# Patient Record
Sex: MALE | Race: OTHER | NOT HISPANIC OR LATINO
[De-identification: names, ages, dates, MRNs, and addresses within clinical notes are randomized per-mention and may not be internally consistent; named-entity substitution may affect disease eponyms.]

---

## 2017-03-13 ENCOUNTER — APPOINTMENT (OUTPATIENT)
Dept: OTOLARYNGOLOGY | Facility: CLINIC | Age: 37
End: 2017-03-13

## 2017-03-13 VITALS
DIASTOLIC BLOOD PRESSURE: 79 MMHG | TEMPERATURE: 97.9 F | SYSTOLIC BLOOD PRESSURE: 145 MMHG | HEART RATE: 52 BPM | OXYGEN SATURATION: 94 %

## 2017-03-13 DIAGNOSIS — H61.23 IMPACTED CERUMEN, BILATERAL: ICD-10-CM

## 2017-03-13 DIAGNOSIS — J06.9 ACUTE UPPER RESPIRATORY INFECTION, UNSPECIFIED: ICD-10-CM

## 2017-03-13 RX ORDER — AZITHROMYCIN 250 MG/1
250 TABLET, FILM COATED ORAL
Qty: 6 | Refills: 0 | Status: ACTIVE | OUTPATIENT
Start: 2017-03-13

## 2017-03-15 RX ORDER — AZITHROMYCIN 250 MG/1
250 TABLET, FILM COATED ORAL
Qty: 6 | Refills: 0 | Status: ACTIVE | COMMUNITY
Start: 2017-03-15 | End: 1900-01-01

## 2017-08-28 ENCOUNTER — APPOINTMENT (OUTPATIENT)
Dept: OTOLARYNGOLOGY | Facility: CLINIC | Age: 37
End: 2017-08-28
Payer: COMMERCIAL

## 2017-08-28 VITALS — HEART RATE: 46 BPM | OXYGEN SATURATION: 98 % | SYSTOLIC BLOOD PRESSURE: 132 MMHG | DIASTOLIC BLOOD PRESSURE: 80 MMHG

## 2017-08-28 PROCEDURE — 31231 NASAL ENDOSCOPY DX: CPT

## 2017-08-28 PROCEDURE — 99214 OFFICE O/P EST MOD 30 MIN: CPT | Mod: 25

## 2017-08-28 RX ORDER — AMOXICILLIN AND CLAVULANATE POTASSIUM 875; 125 MG/1; MG/1
875-125 TABLET, COATED ORAL
Qty: 20 | Refills: 0 | Status: ACTIVE | COMMUNITY
Start: 2017-08-28 | End: 1900-01-01

## 2017-08-28 RX ORDER — AMOXICILLIN 500 MG/1
500 CAPSULE ORAL
Qty: 4 | Refills: 0 | Status: ACTIVE | COMMUNITY
Start: 2017-06-22

## 2017-08-28 RX ORDER — CYCLOBENZAPRINE HYDROCHLORIDE 5 MG/1
5 TABLET, FILM COATED ORAL
Qty: 30 | Refills: 0 | Status: ACTIVE | COMMUNITY
Start: 2017-05-04

## 2017-09-11 ENCOUNTER — APPOINTMENT (OUTPATIENT)
Dept: OTOLARYNGOLOGY | Facility: CLINIC | Age: 37
End: 2017-09-11
Payer: COMMERCIAL

## 2017-09-11 VITALS — HEART RATE: 52 BPM | OXYGEN SATURATION: 97 % | SYSTOLIC BLOOD PRESSURE: 126 MMHG | DIASTOLIC BLOOD PRESSURE: 83 MMHG

## 2017-09-11 PROCEDURE — 99214 OFFICE O/P EST MOD 30 MIN: CPT

## 2017-09-11 RX ORDER — SULFAMETHOXAZOLE AND TRIMETHOPRIM 800; 160 MG/1; MG/1
800-160 TABLET ORAL TWICE DAILY
Qty: 20 | Refills: 0 | Status: ACTIVE | COMMUNITY
Start: 2017-09-11 | End: 1900-01-01

## 2017-09-27 ENCOUNTER — APPOINTMENT (OUTPATIENT)
Dept: OTOLARYNGOLOGY | Facility: CLINIC | Age: 37
End: 2017-09-27
Payer: COMMERCIAL

## 2017-09-27 VITALS — HEART RATE: 53 BPM | SYSTOLIC BLOOD PRESSURE: 127 MMHG | OXYGEN SATURATION: 96 % | DIASTOLIC BLOOD PRESSURE: 74 MMHG

## 2017-09-27 DIAGNOSIS — H92.02 OTALGIA, LEFT EAR: ICD-10-CM

## 2017-09-27 DIAGNOSIS — J32.2 CHRONIC ETHMOIDAL SINUSITIS: ICD-10-CM

## 2017-09-27 PROCEDURE — 99214 OFFICE O/P EST MOD 30 MIN: CPT

## 2018-04-09 ENCOUNTER — APPOINTMENT (OUTPATIENT)
Dept: OTOLARYNGOLOGY | Facility: CLINIC | Age: 38
End: 2018-04-09

## 2018-05-22 ENCOUNTER — RX RENEWAL (OUTPATIENT)
Age: 38
End: 2018-05-22

## 2018-05-22 RX ORDER — FLUTICASONE PROPIONATE 50 UG/1
50 SPRAY, METERED NASAL DAILY
Qty: 16 | Refills: 2 | Status: ACTIVE | COMMUNITY
Start: 2017-08-28 | End: 1900-01-01

## 2018-06-01 ENCOUNTER — APPOINTMENT (OUTPATIENT)
Dept: OTOLARYNGOLOGY | Facility: CLINIC | Age: 38
End: 2018-06-01
Payer: COMMERCIAL

## 2018-06-01 VITALS
DIASTOLIC BLOOD PRESSURE: 79 MMHG | TEMPERATURE: 98.3 F | HEART RATE: 64 BPM | SYSTOLIC BLOOD PRESSURE: 129 MMHG | OXYGEN SATURATION: 95 %

## 2018-06-01 DIAGNOSIS — H61.23 IMPACTED CERUMEN, BILATERAL: ICD-10-CM

## 2018-06-01 PROCEDURE — 99213 OFFICE O/P EST LOW 20 MIN: CPT

## 2018-06-01 RX ORDER — ERYTHROMYCIN 5 MG/G
5 OINTMENT OPHTHALMIC
Qty: 4 | Refills: 0 | Status: ACTIVE | COMMUNITY
Start: 2018-04-19

## 2018-06-01 RX ORDER — CETIRIZINE HYDROCHLORIDE 10 MG/1
10 TABLET, FILM COATED ORAL DAILY
Qty: 90 | Refills: 1 | Status: ACTIVE | COMMUNITY
Start: 2018-06-01 | End: 1900-01-01

## 2019-12-14 ENCOUNTER — INPATIENT (INPATIENT)
Facility: HOSPITAL | Age: 39
LOS: 2 days | Discharge: HOME | DRG: 340 | End: 2019-12-17
Attending: SURGERY | Admitting: SURGERY
Payer: COMMERCIAL

## 2019-12-14 ENCOUNTER — RESULT REVIEW (OUTPATIENT)
Age: 39
End: 2019-12-14

## 2019-12-14 ENCOUNTER — TRANSCRIPTION ENCOUNTER (OUTPATIENT)
Age: 39
End: 2019-12-14

## 2019-12-14 VITALS
HEART RATE: 65 BPM | RESPIRATION RATE: 18 BRPM | WEIGHT: 162.92 LBS | TEMPERATURE: 98 F | OXYGEN SATURATION: 98 % | HEIGHT: 69 IN | SYSTOLIC BLOOD PRESSURE: 122 MMHG | DIASTOLIC BLOOD PRESSURE: 67 MMHG

## 2019-12-14 LAB
ALBUMIN SERPL ELPH-MCNC: 4.4 G/DL — SIGNIFICANT CHANGE UP (ref 3.3–5)
ALP SERPL-CCNC: 90 U/L — SIGNIFICANT CHANGE UP (ref 40–120)
ALT FLD-CCNC: 23 U/L — SIGNIFICANT CHANGE UP (ref 10–45)
ANION GAP SERPL CALC-SCNC: 13 MMOL/L — SIGNIFICANT CHANGE UP (ref 5–17)
APPEARANCE UR: CLEAR — SIGNIFICANT CHANGE UP
AST SERPL-CCNC: 19 U/L — SIGNIFICANT CHANGE UP (ref 10–40)
BASOPHILS # BLD AUTO: 0.04 K/UL — SIGNIFICANT CHANGE UP (ref 0–0.2)
BASOPHILS NFR BLD AUTO: 0.4 % — SIGNIFICANT CHANGE UP (ref 0–2)
BILIRUB SERPL-MCNC: 0.9 MG/DL — SIGNIFICANT CHANGE UP (ref 0.2–1.2)
BILIRUB UR-MCNC: NEGATIVE — SIGNIFICANT CHANGE UP
BUN SERPL-MCNC: 15 MG/DL — SIGNIFICANT CHANGE UP (ref 7–23)
CALCIUM SERPL-MCNC: 9 MG/DL — SIGNIFICANT CHANGE UP (ref 8.4–10.5)
CHLORIDE SERPL-SCNC: 98 MMOL/L — SIGNIFICANT CHANGE UP (ref 96–108)
CO2 SERPL-SCNC: 25 MMOL/L — SIGNIFICANT CHANGE UP (ref 22–31)
COLOR SPEC: YELLOW — SIGNIFICANT CHANGE UP
CREAT SERPL-MCNC: 0.98 MG/DL — SIGNIFICANT CHANGE UP (ref 0.5–1.3)
DIFF PNL FLD: NEGATIVE — SIGNIFICANT CHANGE UP
EOSINOPHIL # BLD AUTO: 0 K/UL — SIGNIFICANT CHANGE UP (ref 0–0.5)
EOSINOPHIL NFR BLD AUTO: 0 % — SIGNIFICANT CHANGE UP (ref 0–6)
GLUCOSE SERPL-MCNC: 180 MG/DL — HIGH (ref 70–99)
GLUCOSE UR QL: NEGATIVE — SIGNIFICANT CHANGE UP
HCT VFR BLD CALC: 44.6 % — SIGNIFICANT CHANGE UP (ref 39–50)
HGB BLD-MCNC: 14.4 G/DL — SIGNIFICANT CHANGE UP (ref 13–17)
IMM GRANULOCYTES NFR BLD AUTO: 0.4 % — SIGNIFICANT CHANGE UP (ref 0–1.5)
KETONES UR-MCNC: NEGATIVE — SIGNIFICANT CHANGE UP
LACTATE SERPL-SCNC: 1.4 MMOL/L — SIGNIFICANT CHANGE UP (ref 0.5–2)
LEUKOCYTE ESTERASE UR-ACNC: NEGATIVE — SIGNIFICANT CHANGE UP
LIDOCAIN IGE QN: 12 U/L — SIGNIFICANT CHANGE UP (ref 7–60)
LYMPHOCYTES # BLD AUTO: 0.5 K/UL — LOW (ref 1–3.3)
LYMPHOCYTES # BLD AUTO: 4.4 % — LOW (ref 13–44)
MCHC RBC-ENTMCNC: 29.1 PG — SIGNIFICANT CHANGE UP (ref 27–34)
MCHC RBC-ENTMCNC: 32.3 GM/DL — SIGNIFICANT CHANGE UP (ref 32–36)
MCV RBC AUTO: 90.1 FL — SIGNIFICANT CHANGE UP (ref 80–100)
MONOCYTES # BLD AUTO: 0.66 K/UL — SIGNIFICANT CHANGE UP (ref 0–0.9)
MONOCYTES NFR BLD AUTO: 5.9 % — SIGNIFICANT CHANGE UP (ref 2–14)
NEUTROPHILS # BLD AUTO: 10.03 K/UL — HIGH (ref 1.8–7.4)
NEUTROPHILS NFR BLD AUTO: 88.9 % — HIGH (ref 43–77)
NITRITE UR-MCNC: NEGATIVE — SIGNIFICANT CHANGE UP
NRBC # BLD: 0 /100 WBCS — SIGNIFICANT CHANGE UP (ref 0–0)
PH UR: 7 — SIGNIFICANT CHANGE UP (ref 5–8)
PLATELET # BLD AUTO: 197 K/UL — SIGNIFICANT CHANGE UP (ref 150–400)
POTASSIUM SERPL-MCNC: 3.9 MMOL/L — SIGNIFICANT CHANGE UP (ref 3.5–5.3)
POTASSIUM SERPL-SCNC: 3.9 MMOL/L — SIGNIFICANT CHANGE UP (ref 3.5–5.3)
PROT SERPL-MCNC: 7 G/DL — SIGNIFICANT CHANGE UP (ref 6–8.3)
PROT UR-MCNC: NEGATIVE MG/DL — SIGNIFICANT CHANGE UP
RBC # BLD: 4.95 M/UL — SIGNIFICANT CHANGE UP (ref 4.2–5.8)
RBC # FLD: 12.3 % — SIGNIFICANT CHANGE UP (ref 10.3–14.5)
SODIUM SERPL-SCNC: 136 MMOL/L — SIGNIFICANT CHANGE UP (ref 135–145)
SP GR SPEC: <=1.005 — SIGNIFICANT CHANGE UP (ref 1–1.03)
UROBILINOGEN FLD QL: 0.2 E.U./DL — SIGNIFICANT CHANGE UP
WBC # BLD: 11.27 K/UL — HIGH (ref 3.8–10.5)
WBC # FLD AUTO: 11.27 K/UL — HIGH (ref 3.8–10.5)

## 2019-12-14 PROCEDURE — 93010 ELECTROCARDIOGRAM REPORT: CPT | Mod: NC

## 2019-12-14 PROCEDURE — 74177 CT ABD & PELVIS W/CONTRAST: CPT | Mod: 26

## 2019-12-14 PROCEDURE — 88304 TISSUE EXAM BY PATHOLOGIST: CPT | Mod: 26

## 2019-12-14 PROCEDURE — 99285 EMERGENCY DEPT VISIT HI MDM: CPT

## 2019-12-14 RX ORDER — SODIUM CHLORIDE 9 MG/ML
1000 INJECTION INTRAMUSCULAR; INTRAVENOUS; SUBCUTANEOUS
Refills: 0 | Status: DISCONTINUED | OUTPATIENT
Start: 2019-12-14 | End: 2019-12-15

## 2019-12-14 RX ORDER — CEFTRIAXONE 500 MG/1
1000 INJECTION, POWDER, FOR SOLUTION INTRAMUSCULAR; INTRAVENOUS ONCE
Refills: 0 | Status: COMPLETED | OUTPATIENT
Start: 2019-12-14 | End: 2019-12-14

## 2019-12-14 RX ORDER — METRONIDAZOLE 500 MG
500 TABLET ORAL EVERY 8 HOURS
Refills: 0 | Status: DISCONTINUED | OUTPATIENT
Start: 2019-12-14 | End: 2019-12-17

## 2019-12-14 RX ORDER — KETOROLAC TROMETHAMINE 30 MG/ML
15 SYRINGE (ML) INJECTION EVERY 6 HOURS
Refills: 0 | Status: COMPLETED | OUTPATIENT
Start: 2019-12-15 | End: 2019-12-18

## 2019-12-14 RX ORDER — SODIUM CHLORIDE 9 MG/ML
1000 INJECTION INTRAMUSCULAR; INTRAVENOUS; SUBCUTANEOUS ONCE
Refills: 0 | Status: COMPLETED | OUTPATIENT
Start: 2019-12-14 | End: 2019-12-14

## 2019-12-14 RX ORDER — HYDROMORPHONE HYDROCHLORIDE 2 MG/ML
0.5 INJECTION INTRAMUSCULAR; INTRAVENOUS; SUBCUTANEOUS EVERY 6 HOURS
Refills: 0 | Status: DISCONTINUED | OUTPATIENT
Start: 2019-12-14 | End: 2019-12-14

## 2019-12-14 RX ORDER — METRONIDAZOLE 500 MG
500 TABLET ORAL ONCE
Refills: 0 | Status: COMPLETED | OUTPATIENT
Start: 2019-12-14 | End: 2019-12-14

## 2019-12-14 RX ORDER — MORPHINE SULFATE 50 MG/1
4 CAPSULE, EXTENDED RELEASE ORAL ONCE
Refills: 0 | Status: DISCONTINUED | OUTPATIENT
Start: 2019-12-14 | End: 2019-12-14

## 2019-12-14 RX ORDER — HYDROMORPHONE HYDROCHLORIDE 2 MG/ML
0.5 INJECTION INTRAMUSCULAR; INTRAVENOUS; SUBCUTANEOUS
Refills: 0 | Status: DISCONTINUED | OUTPATIENT
Start: 2019-12-14 | End: 2019-12-15

## 2019-12-14 RX ORDER — ENOXAPARIN SODIUM 100 MG/ML
40 INJECTION SUBCUTANEOUS EVERY 24 HOURS
Refills: 0 | Status: DISCONTINUED | OUTPATIENT
Start: 2019-12-14 | End: 2019-12-17

## 2019-12-14 RX ORDER — OXYCODONE HYDROCHLORIDE 5 MG/1
5 TABLET ORAL EVERY 4 HOURS
Refills: 0 | Status: DISCONTINUED | OUTPATIENT
Start: 2019-12-14 | End: 2019-12-17

## 2019-12-14 RX ORDER — ONDANSETRON 8 MG/1
4 TABLET, FILM COATED ORAL EVERY 6 HOURS
Refills: 0 | Status: DISCONTINUED | OUTPATIENT
Start: 2019-12-14 | End: 2019-12-17

## 2019-12-14 RX ORDER — IOHEXOL 300 MG/ML
30 INJECTION, SOLUTION INTRAVENOUS ONCE
Refills: 0 | Status: COMPLETED | OUTPATIENT
Start: 2019-12-14 | End: 2019-12-14

## 2019-12-14 RX ORDER — OXYCODONE HYDROCHLORIDE 5 MG/1
10 TABLET ORAL EVERY 4 HOURS
Refills: 0 | Status: DISCONTINUED | OUTPATIENT
Start: 2019-12-14 | End: 2019-12-17

## 2019-12-14 RX ORDER — SODIUM CHLORIDE 9 MG/ML
1000 INJECTION INTRAMUSCULAR; INTRAVENOUS; SUBCUTANEOUS
Refills: 0 | Status: DISCONTINUED | OUTPATIENT
Start: 2019-12-14 | End: 2019-12-14

## 2019-12-14 RX ORDER — HYDROMORPHONE HYDROCHLORIDE 2 MG/ML
0.5 INJECTION INTRAMUSCULAR; INTRAVENOUS; SUBCUTANEOUS EVERY 4 HOURS
Refills: 0 | Status: DISCONTINUED | OUTPATIENT
Start: 2019-12-14 | End: 2019-12-17

## 2019-12-14 RX ORDER — ACETAMINOPHEN 500 MG
975 TABLET ORAL EVERY 6 HOURS
Refills: 0 | Status: DISCONTINUED | OUTPATIENT
Start: 2019-12-15 | End: 2019-12-17

## 2019-12-14 RX ORDER — HYDROMORPHONE HYDROCHLORIDE 2 MG/ML
1 INJECTION INTRAMUSCULAR; INTRAVENOUS; SUBCUTANEOUS EVERY 6 HOURS
Refills: 0 | Status: DISCONTINUED | OUTPATIENT
Start: 2019-12-14 | End: 2019-12-14

## 2019-12-14 RX ORDER — CEFTRIAXONE 500 MG/1
1000 INJECTION, POWDER, FOR SOLUTION INTRAMUSCULAR; INTRAVENOUS EVERY 24 HOURS
Refills: 0 | Status: DISCONTINUED | OUTPATIENT
Start: 2019-12-15 | End: 2019-12-17

## 2019-12-14 RX ADMIN — IOHEXOL 30 MILLILITER(S): 300 INJECTION, SOLUTION INTRAVENOUS at 11:30

## 2019-12-14 RX ADMIN — SODIUM CHLORIDE 115 MILLILITER(S): 9 INJECTION INTRAMUSCULAR; INTRAVENOUS; SUBCUTANEOUS at 16:00

## 2019-12-14 RX ADMIN — OXYCODONE HYDROCHLORIDE 5 MILLIGRAM(S): 5 TABLET ORAL at 21:49

## 2019-12-14 RX ADMIN — Medication 100 MILLIGRAM(S): at 23:52

## 2019-12-14 RX ADMIN — CEFTRIAXONE 1000 MILLIGRAM(S): 500 INJECTION, POWDER, FOR SOLUTION INTRAMUSCULAR; INTRAVENOUS at 15:10

## 2019-12-14 RX ADMIN — ENOXAPARIN SODIUM 40 MILLIGRAM(S): 100 INJECTION SUBCUTANEOUS at 21:29

## 2019-12-14 RX ADMIN — SODIUM CHLORIDE 1000 MILLILITER(S): 9 INJECTION INTRAMUSCULAR; INTRAVENOUS; SUBCUTANEOUS at 11:30

## 2019-12-14 RX ADMIN — MORPHINE SULFATE 4 MILLIGRAM(S): 50 CAPSULE, EXTENDED RELEASE ORAL at 12:13

## 2019-12-14 RX ADMIN — Medication 100 MILLIGRAM(S): at 15:13

## 2019-12-14 RX ADMIN — CEFTRIAXONE 100 MILLIGRAM(S): 500 INJECTION, POWDER, FOR SOLUTION INTRAMUSCULAR; INTRAVENOUS at 14:52

## 2019-12-14 RX ADMIN — OXYCODONE HYDROCHLORIDE 5 MILLIGRAM(S): 5 TABLET ORAL at 22:49

## 2019-12-14 RX ADMIN — MORPHINE SULFATE 4 MILLIGRAM(S): 50 CAPSULE, EXTENDED RELEASE ORAL at 11:30

## 2019-12-14 NOTE — ED ADULT TRIAGE NOTE - CHIEF COMPLAINT QUOTE
Patient complaining of generalized abdominal pain with radiation to right lower back since today.  Patient denies any N/V/D, fevers, chills, urinary symptoms or any other complaints at this time.

## 2019-12-14 NOTE — BRIEF OPERATIVE NOTE - OPERATION/FINDINGS
Appendix identified (perforated, contained by ligament of Treves, purulent fluid localized to RLQ & deep pelvis). Cecum bluntly mobilized. Mesoappendix divided using Ligasure device. 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 Staple line inspected laparoscopically (no leak). 15Fr Inderjit drain left in pelvis & RLQ. Good hemostasis at end of case. Fascia closed w/ 0-Vicryl sutures. Skin closed w/ 4-0 Monocryl

## 2019-12-14 NOTE — ED PROVIDER NOTE - CLINICAL SUMMARY MEDICAL DECISION MAKING FREE TEXT BOX
diffuse abd pain and flank pain. pain meds given. VSS. a febrile. ua at urgent care negative for blood. labs noted. ct scan done and + perforated appendicitis. antibiotics given. surgery consulted and evaluated pt in ED. recommend admission for OR

## 2019-12-14 NOTE — H&P ADULT - NSHPPHYSICALEXAM_GEN_ALL_CORE
Physical Exam:  General: NAD  Pulmonary: Nonlabored breathing, no respiratory distress  Abdominal: Soft, tender to light palpation in RLQ, tender to deep palpation in all quadrants, no rebound tenderness, distended  Neuro: AAO x3, no focal deficits

## 2019-12-14 NOTE — ED PROVIDER NOTE - OBJECTIVE STATEMENT
38 y/o male with no sig PMHX c/o abd pain x this am. pt states pain began around 4:30 am today and has progressively worsened. pt sharp diffuse abd pain and pain to right flank. no fever or chills. no n/v. no urinary sx's or testicular pain. no diarrhea or constipation. no bloody stool or melena. no cp or sob. no fever or chills. pt states seen at urgent care and normal u/a and sent to ED for further eval.

## 2019-12-14 NOTE — PROGRESS NOTE ADULT - SUBJECTIVE AND OBJECTIVE BOX
POST-OPERATIVE NOTE    Procedure: lap appendectomy    Diagnosis/Indication: appendicitis     Surgeon: Анна     S: C/o of expected post-operative pain, well controlled w/ medication. Denies CP, SOB, SLOAN, calf tenderness. Denies nausea, vomiting.    O:  T(C): 36.9 (12-14-19 @ 21:47), Max: 37.2 (12-14-19 @ 21:05)  T(F): 98.4 (12-14-19 @ 21:47), Max: 99 (12-14-19 @ 21:05)  HR: 72 (12-14-19 @ 21:47) (72 - 92)  BP: 116/71 (12-14-19 @ 21:47) (111/60 - 137/65)  RR: 18 (12-14-19 @ 21:47) (16 - 20)  SpO2: 94% (12-14-19 @ 21:47) (94% - 97%)  Wt(kg): --                        14.4   11.27 )-----------( 197      ( 14 Dec 2019 11:22 )             44.6     12-14    136  |  98  |  15  ----------------------------<  180<H>  3.9   |  25  |  0.98    Ca    9.0      14 Dec 2019 11:22    TPro  7.0  /  Alb  4.4  /  TBili  0.9  /  DBili  x   /  AST  19  /  ALT  23  /  AlkPhos  90  12-14      Gen: NAD, resting comfortably in bed  C/V: NSR  Pulm: Nonlabored breathing, no respiratory distress  Abd: soft, appropriately tender, non distended, incisions clean, dry and intact.   Extrem: WWP, no calf edema or tenderness, SCDs in place

## 2019-12-14 NOTE — H&P ADULT - ATTENDING COMMENTS
Pain since 4am  Tender in RLQ and LLQ  CT consistent with appendicitis, surrounding fluid    POssible perforated appendicitis.  OR for lap/open appendectomy.  Risks reviewed with patient and wife. Questions answered. He agrees to proceed.  Ceftriaxone, flagyl.

## 2019-12-14 NOTE — ED PROVIDER NOTE - GASTROINTESTINAL, MLM
Abdomen soft, diffuse abd tenderness with gaurding. no rebound. no distention. mild right cva tenderness.

## 2019-12-14 NOTE — H&P ADULT - ASSESSMENT
A/P: 39M PMH unknown heart valve problem, presets with acute appendicitis    - Admit to regional bed under Dr. Jj  - Pain/nausea PRN  - NPO/IVF  - Ceftriaxone/Flagyl  - OOBA/SCDs  - Preoperative labs, EKG, CXR  - Add on appendectomy  - Case discussed with chief resident

## 2019-12-14 NOTE — H&P ADULT - HISTORY OF PRESENT ILLNESS
39M PMH unknown heart valve problem, presets with 12 hours of diffuse abdominal pain. The pain is described as sharp and can't be characterized into a quadrant. The patient last ate at 1AM, and has not had any nausea/vomiting. He has passed flatus and a bowel movement this morning. He has had chills, but no subjective fevers, sick contacts, or recent travel. He denies any history of colonoscopy, EGD, or IBD. His heart valve problem is followed once a year with an echo by a cardiologist, but has never required intervention or medication.

## 2019-12-14 NOTE — ED CLERICAL - NS ED CLERK NOTE PRE-ARRIVAL INFORMATION; ADDITIONAL PRE-ARRIVAL INFORMATION
40 Y/O M JYA JOHNSON BEING SENT IN BY DR ENCINAS FROM [facility] FOR SEVERE AB PAIN 10/10 NO FEVER CHILLS NAUSEA  OR VOMITING

## 2019-12-14 NOTE — H&P ADULT - NSHPLABSRESULTS_GEN_ALL_CORE
Vital Signs:  Vital Signs Last 24 Hrs  T(C): 37.6 (14 Dec 2019 14:14), Max: 37.6 (14 Dec 2019 14:14)  T(F): 99.7 (14 Dec 2019 14:14), Max: 99.7 (14 Dec 2019 14:14)  HR: 87 (14 Dec 2019 14:14) (65 - 87)  BP: 111/63 (14 Dec 2019 14:14) (111/63 - 123/76)  BP(mean): --  RR: 18 (14 Dec 2019 14:14) (18 - 18)  SpO2: 98% (14 Dec 2019 14:14) (98% - 98%)      Labs:                        14.4   11.27 )-----------( 197      ( 14 Dec 2019 11:22 )             44.6     12-14    136  |  98  |  15  ----------------------------<  180<H>  3.9   |  25  |  0.98    Ca    9.0      14 Dec 2019 11:22    TPro  7.0  /  Alb  4.4  /  TBili  0.9  /  DBili  x   /  AST  19  /  ALT  23  /  AlkPhos  90  12-14      Urinalysis Basic - ( 14 Dec 2019 13:54 )    Color: Yellow / Appearance: Clear / SG: <=1.005 / pH: x  Gluc: x / Ketone: NEGATIVE  / Bili: Negative / Urobili: 0.2 E.U./dL   Blood: x / Protein: NEGATIVE mg/dL / Nitrite: NEGATIVE   Leuk Esterase: NEGATIVE / RBC: x / WBC x   Sq Epi: x / Non Sq Epi: x / Bacteria: x      CAPILLARY BLOOD GLUCOSE        LIVER FUNCTIONS - ( 14 Dec 2019 11:22 )  Alb: 4.4 g/dL / Pro: 7.0 g/dL / ALK PHOS: 90 U/L / ALT: 23 U/L / AST: 19 U/L / GGT: x    CT A/P W/ PO AND IV CONTRAST  LOWER CHEST: Within normal limits     ABDOMEN:   LIVER: Multiple cysts and subcentimeter hypodensities likely benign.   BILE DUCTS: Normal caliber   GALLBLADDER: No calcified gallstones. Normal caliber wall   PANCREAS: Within normal limits   SPLEEN: Within normal limits   ADRENALS: Within normal limits   KIDNEYS: Within normal limits     PELVIS:   REPRODUCTIVE ORGANS: No pelvic masses   BLADDER: Within normal limits     PERITONEUM:No ascites, no free air.   BOWEL: Markedly thickened appendix with irregular wall and extensive   surrounding inflammatory change. Small 2.8 x 0.9 cm abscess posteriorly.   Ileus or reactive thickening of the adjacent small bowel. Stomach distended   with reflux into the distal esophagus.   VESSELS: Within normal limits   RETROPERITONEUM: No retroperitoneal or pelvic adenopathy   ABDOMINAL WALL: Within normal limits   MUSCULOSKELETAL: Within normal limits     IMPRESSION:     Acute appendicitis, likely microperforated. Small posterior abscess.

## 2019-12-14 NOTE — ED ADULT NURSE NOTE - OBJECTIVE STATEMENT
pt having upper abdominal pain since awaking this am , denies nausea/vomiting/diarrhea , last BM was this am , normal stool , pt having abdominal bloating

## 2019-12-14 NOTE — PROGRESS NOTE ADULT - ASSESSMENT
A/P: 39y Male s/p above procedure  CLD/IVF  Continue Abx  Pain/nausea control  SQH/SCDs/OOBA/IS  Dispo pending pain control, PO tolerance, clinical improvement

## 2019-12-15 LAB
ANION GAP SERPL CALC-SCNC: 10 MMOL/L — SIGNIFICANT CHANGE UP (ref 5–17)
BUN SERPL-MCNC: 12 MG/DL — SIGNIFICANT CHANGE UP (ref 7–23)
CALCIUM SERPL-MCNC: 8.2 MG/DL — LOW (ref 8.4–10.5)
CHLORIDE SERPL-SCNC: 106 MMOL/L — SIGNIFICANT CHANGE UP (ref 96–108)
CO2 SERPL-SCNC: 23 MMOL/L — SIGNIFICANT CHANGE UP (ref 22–31)
CREAT SERPL-MCNC: 0.87 MG/DL — SIGNIFICANT CHANGE UP (ref 0.5–1.3)
GLUCOSE SERPL-MCNC: 144 MG/DL — HIGH (ref 70–99)
GRAM STN FLD: SIGNIFICANT CHANGE UP
HCT VFR BLD CALC: 40.1 % — SIGNIFICANT CHANGE UP (ref 39–50)
HGB BLD-MCNC: 13.1 G/DL — SIGNIFICANT CHANGE UP (ref 13–17)
MAGNESIUM SERPL-MCNC: 1.9 MG/DL — SIGNIFICANT CHANGE UP (ref 1.6–2.6)
MCHC RBC-ENTMCNC: 29.4 PG — SIGNIFICANT CHANGE UP (ref 27–34)
MCHC RBC-ENTMCNC: 32.7 GM/DL — SIGNIFICANT CHANGE UP (ref 32–36)
MCV RBC AUTO: 90.1 FL — SIGNIFICANT CHANGE UP (ref 80–100)
NRBC # BLD: 0 /100 WBCS — SIGNIFICANT CHANGE UP (ref 0–0)
PHOSPHATE SERPL-MCNC: 3.1 MG/DL — SIGNIFICANT CHANGE UP (ref 2.5–4.5)
PLATELET # BLD AUTO: 197 K/UL — SIGNIFICANT CHANGE UP (ref 150–400)
POTASSIUM SERPL-MCNC: 4.1 MMOL/L — SIGNIFICANT CHANGE UP (ref 3.5–5.3)
POTASSIUM SERPL-SCNC: 4.1 MMOL/L — SIGNIFICANT CHANGE UP (ref 3.5–5.3)
RBC # BLD: 4.45 M/UL — SIGNIFICANT CHANGE UP (ref 4.2–5.8)
RBC # FLD: 12.9 % — SIGNIFICANT CHANGE UP (ref 10.3–14.5)
SODIUM SERPL-SCNC: 139 MMOL/L — SIGNIFICANT CHANGE UP (ref 135–145)
SPECIMEN SOURCE: SIGNIFICANT CHANGE UP
WBC # BLD: 13.51 K/UL — HIGH (ref 3.8–10.5)
WBC # FLD AUTO: 13.51 K/UL — HIGH (ref 3.8–10.5)

## 2019-12-15 RX ADMIN — Medication 975 MILLIGRAM(S): at 00:19

## 2019-12-15 RX ADMIN — Medication 15 MILLIGRAM(S): at 06:02

## 2019-12-15 RX ADMIN — CEFTRIAXONE 100 MILLIGRAM(S): 500 INJECTION, POWDER, FOR SOLUTION INTRAMUSCULAR; INTRAVENOUS at 13:34

## 2019-12-15 RX ADMIN — Medication 975 MILLIGRAM(S): at 19:08

## 2019-12-15 RX ADMIN — Medication 100 MILLIGRAM(S): at 16:45

## 2019-12-15 RX ADMIN — Medication 975 MILLIGRAM(S): at 06:01

## 2019-12-15 RX ADMIN — Medication 15 MILLIGRAM(S): at 00:20

## 2019-12-15 RX ADMIN — Medication 15 MILLIGRAM(S): at 13:33

## 2019-12-15 RX ADMIN — Medication 975 MILLIGRAM(S): at 13:34

## 2019-12-15 RX ADMIN — Medication 100 MILLIGRAM(S): at 06:02

## 2019-12-15 RX ADMIN — Medication 100 MILLIGRAM(S): at 22:19

## 2019-12-15 RX ADMIN — Medication 975 MILLIGRAM(S): at 01:19

## 2019-12-15 RX ADMIN — Medication 15 MILLIGRAM(S): at 13:50

## 2019-12-15 RX ADMIN — ENOXAPARIN SODIUM 40 MILLIGRAM(S): 100 INJECTION SUBCUTANEOUS at 22:18

## 2019-12-15 RX ADMIN — Medication 15 MILLIGRAM(S): at 00:35

## 2019-12-15 RX ADMIN — Medication 975 MILLIGRAM(S): at 14:34

## 2019-12-15 RX ADMIN — Medication 15 MILLIGRAM(S): at 19:08

## 2019-12-15 NOTE — PROGRESS NOTE ADULT - SUBJECTIVE AND OBJECTIVE BOX
SUBJECTIVE: Pt seen and examined at bedside with chief. Pt denies any complaints. Pain well controlled. Denies N/V.     MEDICATIONS  (STANDING):  acetaminophen   Tablet .. 975 milliGRAM(s) Oral every 6 hours  cefTRIAXone   IVPB 1000 milliGRAM(s) IV Intermittent every 24 hours  enoxaparin Injectable 40 milliGRAM(s) SubCutaneous every 24 hours  ketorolac   Injectable 15 milliGRAM(s) IV Push every 6 hours  metroNIDAZOLE  IVPB 500 milliGRAM(s) IV Intermittent every 8 hours    MEDICATIONS  (PRN):  HYDROmorphone  Injectable 0.5 milliGRAM(s) IV Push every 10 minutes PRN breakthrough pain in pacu only  HYDROmorphone  Injectable 0.5 milliGRAM(s) IV Push every 4 hours PRN breakthrough pain  ondansetron Injectable 4 milliGRAM(s) IV Push every 6 hours PRN Nausea  oxyCODONE    IR 5 milliGRAM(s) Oral every 4 hours PRN Moderate Pain (4 - 6)  oxyCODONE    IR 10 milliGRAM(s) Oral every 4 hours PRN Severe Pain (7 - 10)      Vital Signs Last 24 Hrs  T(C): 36.9 (15 Dec 2019 08:30), Max: 37.9 (14 Dec 2019 16:00)  T(F): 98.5 (15 Dec 2019 08:30), Max: 100.2 (14 Dec 2019 16:00)  HR: 58 (15 Dec 2019 08:30) (58 - 99)  BP: 101/47 (15 Dec 2019 08:30) (101/47 - 137/65)  BP(mean): 78 (14 Dec 2019 21:05) (78 - 88)  RR: 18 (15 Dec 2019 08:30) (16 - 20)  SpO2: 97% (15 Dec 2019 08:30) (94% - 98%)    PHYSICAL EXAM:      Constitutional: A&Ox3    Respiratory: non labored breathing, no respiratory distress    Cardiovascular: NSR, RRR    Gastrointestinal: soft ND, appropriately tender                 Incision: CDI    Genitourinary: voiding     Extremities: (-) edema                  I&O's Detail    14 Dec 2019 07:01  -  15 Dec 2019 07:00  --------------------------------------------------------  IN:    Oral Fluid: 580 mL    sodium chloride 0.9%: 1300 mL  Total IN: 1880 mL    OUT:    Bulb: 20 mL    Voided: 1720 mL  Total OUT: 1740 mL    Total NET: 140 mL          LABS:                        13.1   13.51 )-----------( 197      ( 15 Dec 2019 07:45 )             40.1     12-15    139  |  106  |  12  ----------------------------<  144<H>  4.1   |  23  |  0.87    Ca    8.2<L>      15 Dec 2019 07:45  Phos  3.1     12-15  Mg     1.9     12-15    TPro  7.0  /  Alb  4.4  /  TBili  0.9  /  DBili  x   /  AST  19  /  ALT  23  /  AlkPhos  90  12-14      Urinalysis Basic - ( 14 Dec 2019 13:54 )    Color: Yellow / Appearance: Clear / SG: <=1.005 / pH: x  Gluc: x / Ketone: NEGATIVE  / Bili: Negative / Urobili: 0.2 E.U./dL   Blood: x / Protein: NEGATIVE mg/dL / Nitrite: NEGATIVE   Leuk Esterase: NEGATIVE / RBC: x / WBC x   Sq Epi: x / Non Sq Epi: x / Bacteria: x        RADIOLOGY & ADDITIONAL STUDIES:

## 2019-12-15 NOTE — PROGRESS NOTE ADULT - ATTENDING COMMENTS
Tolerating regular diet.  AFVSS  Abd soft, mild dist, incisions clean  AYSHA SSF    Perforated appendicitis s/p lap appy.  Continue abx  OOB, IS

## 2019-12-15 NOTE — PROGRESS NOTE ADULT - ASSESSMENT
39M PMH unknown heart valve problem, presets with acute appendicitis now s/p lap appy 12/14    - reg diet   - pain/nausea control  - Ceftriaxone/Flagyl for perforated appendicitis  - Inderjit drain to bulb suction   - Lovenox, OOB/A, SCDs, IS  - AM labs

## 2019-12-16 LAB
-  AMPICILLIN/SULBACTAM: SIGNIFICANT CHANGE UP
-  AMPICILLIN: SIGNIFICANT CHANGE UP
-  CEFAZOLIN: SIGNIFICANT CHANGE UP
-  CEFTRIAXONE: SIGNIFICANT CHANGE UP
-  CIPROFLOXACIN: SIGNIFICANT CHANGE UP
-  GENTAMICIN: SIGNIFICANT CHANGE UP
-  PIPERACILLIN/TAZOBACTAM: SIGNIFICANT CHANGE UP
-  TOBRAMYCIN: SIGNIFICANT CHANGE UP
-  TRIMETHOPRIM/SULFAMETHOXAZOLE: SIGNIFICANT CHANGE UP
ANION GAP SERPL CALC-SCNC: 10 MMOL/L — SIGNIFICANT CHANGE UP (ref 5–17)
BUN SERPL-MCNC: 11 MG/DL — SIGNIFICANT CHANGE UP (ref 7–23)
CALCIUM SERPL-MCNC: 8 MG/DL — LOW (ref 8.4–10.5)
CHLORIDE SERPL-SCNC: 108 MMOL/L — SIGNIFICANT CHANGE UP (ref 96–108)
CO2 SERPL-SCNC: 24 MMOL/L — SIGNIFICANT CHANGE UP (ref 22–31)
CREAT SERPL-MCNC: 0.93 MG/DL — SIGNIFICANT CHANGE UP (ref 0.5–1.3)
GLUCOSE BLDC GLUCOMTR-MCNC: 98 MG/DL — SIGNIFICANT CHANGE UP (ref 70–99)
GLUCOSE SERPL-MCNC: 102 MG/DL — HIGH (ref 70–99)
HCT VFR BLD CALC: 41.5 % — SIGNIFICANT CHANGE UP (ref 39–50)
HGB BLD-MCNC: 12.9 G/DL — LOW (ref 13–17)
MAGNESIUM SERPL-MCNC: 2 MG/DL — SIGNIFICANT CHANGE UP (ref 1.6–2.6)
MCHC RBC-ENTMCNC: 28.6 PG — SIGNIFICANT CHANGE UP (ref 27–34)
MCHC RBC-ENTMCNC: 31.1 GM/DL — LOW (ref 32–36)
MCV RBC AUTO: 92 FL — SIGNIFICANT CHANGE UP (ref 80–100)
METHOD TYPE: SIGNIFICANT CHANGE UP
METHOD TYPE: SIGNIFICANT CHANGE UP
NRBC # BLD: 0 /100 WBCS — SIGNIFICANT CHANGE UP (ref 0–0)
PHOSPHATE SERPL-MCNC: 1.3 MG/DL — LOW (ref 2.5–4.5)
PLATELET # BLD AUTO: 195 K/UL — SIGNIFICANT CHANGE UP (ref 150–400)
POTASSIUM SERPL-MCNC: 4 MMOL/L — SIGNIFICANT CHANGE UP (ref 3.5–5.3)
POTASSIUM SERPL-SCNC: 4 MMOL/L — SIGNIFICANT CHANGE UP (ref 3.5–5.3)
RBC # BLD: 4.51 M/UL — SIGNIFICANT CHANGE UP (ref 4.2–5.8)
RBC # FLD: 13.1 % — SIGNIFICANT CHANGE UP (ref 10.3–14.5)
SODIUM SERPL-SCNC: 142 MMOL/L — SIGNIFICANT CHANGE UP (ref 135–145)
WBC # BLD: 12.19 K/UL — HIGH (ref 3.8–10.5)
WBC # FLD AUTO: 12.19 K/UL — HIGH (ref 3.8–10.5)

## 2019-12-16 RX ORDER — POTASSIUM PHOSPHATE, MONOBASIC POTASSIUM PHOSPHATE, DIBASIC 236; 224 MG/ML; MG/ML
15 INJECTION, SOLUTION INTRAVENOUS ONCE
Refills: 0 | Status: COMPLETED | OUTPATIENT
Start: 2019-12-16 | End: 2019-12-16

## 2019-12-16 RX ADMIN — Medication 975 MILLIGRAM(S): at 07:03

## 2019-12-16 RX ADMIN — Medication 15 MILLIGRAM(S): at 18:18

## 2019-12-16 RX ADMIN — ENOXAPARIN SODIUM 40 MILLIGRAM(S): 100 INJECTION SUBCUTANEOUS at 22:13

## 2019-12-16 RX ADMIN — Medication 975 MILLIGRAM(S): at 12:09

## 2019-12-16 RX ADMIN — Medication 15 MILLIGRAM(S): at 06:02

## 2019-12-16 RX ADMIN — Medication 15 MILLIGRAM(S): at 01:00

## 2019-12-16 RX ADMIN — Medication 100 MILLIGRAM(S): at 22:14

## 2019-12-16 RX ADMIN — Medication 975 MILLIGRAM(S): at 01:43

## 2019-12-16 RX ADMIN — Medication 15 MILLIGRAM(S): at 13:00

## 2019-12-16 RX ADMIN — Medication 975 MILLIGRAM(S): at 19:18

## 2019-12-16 RX ADMIN — POTASSIUM PHOSPHATE, MONOBASIC POTASSIUM PHOSPHATE, DIBASIC 63.75 MILLIMOLE(S): 236; 224 INJECTION, SOLUTION INTRAVENOUS at 11:28

## 2019-12-16 RX ADMIN — Medication 975 MILLIGRAM(S): at 06:03

## 2019-12-16 RX ADMIN — Medication 15 MILLIGRAM(S): at 06:33

## 2019-12-16 RX ADMIN — Medication 100 MILLIGRAM(S): at 14:01

## 2019-12-16 RX ADMIN — Medication 975 MILLIGRAM(S): at 13:00

## 2019-12-16 RX ADMIN — Medication 975 MILLIGRAM(S): at 18:18

## 2019-12-16 RX ADMIN — Medication 975 MILLIGRAM(S): at 00:43

## 2019-12-16 RX ADMIN — Medication 100 MILLIGRAM(S): at 06:02

## 2019-12-16 RX ADMIN — Medication 15 MILLIGRAM(S): at 00:43

## 2019-12-16 RX ADMIN — Medication 15 MILLIGRAM(S): at 12:10

## 2019-12-16 RX ADMIN — Medication 15 MILLIGRAM(S): at 19:18

## 2019-12-16 RX ADMIN — CEFTRIAXONE 100 MILLIGRAM(S): 500 INJECTION, POWDER, FOR SOLUTION INTRAMUSCULAR; INTRAVENOUS at 13:20

## 2019-12-16 NOTE — PROGRESS NOTE ADULT - ATTENDING COMMENTS
Eating well.  passing flatus.  AFVSS  Abd soft, min distention. Incisions clean    AYSHA SSF    WBC 12.5    A/P: PErforated appendicitis, POD 2 lap appendectomy.  Clinically well.  1. Ceftriaxone, flagyl  2. Follow WBC.  3. OOB, IS.

## 2019-12-16 NOTE — PROGRESS NOTE ADULT - SUBJECTIVE AND OBJECTIVE BOX
Post op day #2 from laparoscopic appendecotmy  SUBJECTIVE: Patient seen and examined bedside. Patient reports that he is passing flatus. Patient is tolerating regular diet.    cefTRIAXone   IVPB 1000 milliGRAM(s) IV Intermittent every 24 hours  enoxaparin Injectable 40 milliGRAM(s) SubCutaneous every 24 hours  metroNIDAZOLE  IVPB 500 milliGRAM(s) IV Intermittent every 8 hours      Vital Signs Last 24 Hrs  T(C): 37.1 (16 Dec 2019 05:47), Max: 37.1 (16 Dec 2019 05:47)  T(F): 98.7 (16 Dec 2019 05:47), Max: 98.7 (16 Dec 2019 05:47)  HR: 68 (16 Dec 2019 05:47) (58 - 75)  BP: 114/63 (16 Dec 2019 05:47) (101/47 - 114/63)  BP(mean): --  RR: 17 (16 Dec 2019 05:47) (17 - 18)  SpO2: 94% (16 Dec 2019 05:47) (94% - 97%)  I&O's Detail    15 Dec 2019 07:01  -  16 Dec 2019 07:00  --------------------------------------------------------  IN:    Oral Fluid: 1440 mL    sodium chloride 0.9%: 100 mL    Solution: 350 mL  Total IN: 1890 mL    OUT:    Bulb: 47.5 mL    Voided: 2800 mL  Total OUT: 2847.5 mL    Total NET: -957.5 mL          General: NAD, resting comfortably in bed  C/V: NSR  Pulm: Nonlabored breathing, no respiratory distress  Abd: soft, nondisnteded, nontender, surgical incisions clean, dry ,and intact.  Extrem: WWP, no edema, SCDs in place        LABS:                        12.9   12.19 )-----------( 195      ( 16 Dec 2019 07:47 )             41.5     12-15    139  |  106  |  12  ----------------------------<  144<H>  4.1   |  23  |  0.87    Ca    8.2<L>      15 Dec 2019 07:45  Phos  3.1     12-15  Mg     1.9     12-15    TPro  7.0  /  Alb  4.4  /  TBili  0.9  /  DBili  x   /  AST  19  /  ALT  23  /  AlkPhos  90  12-14      Urinalysis Basic - ( 14 Dec 2019 13:54 )    Color: Yellow / Appearance: Clear / SG: <=1.005 / pH: x  Gluc: x / Ketone: NEGATIVE  / Bili: Negative / Urobili: 0.2 E.U./dL   Blood: x / Protein: NEGATIVE mg/dL / Nitrite: NEGATIVE   Leuk Esterase: NEGATIVE / RBC: x / WBC x   Sq Epi: x / Non Sq Epi: x / Bacteria: x        RADIOLOGY & ADDITIONAL STUDIES:

## 2019-12-17 ENCOUNTER — TRANSCRIPTION ENCOUNTER (OUTPATIENT)
Age: 39
End: 2019-12-17

## 2019-12-17 VITALS
SYSTOLIC BLOOD PRESSURE: 118 MMHG | TEMPERATURE: 99 F | HEART RATE: 68 BPM | RESPIRATION RATE: 17 BRPM | DIASTOLIC BLOOD PRESSURE: 71 MMHG | OXYGEN SATURATION: 95 %

## 2019-12-17 LAB
-  AMPICILLIN: SIGNIFICANT CHANGE UP
-  VANCOMYCIN: SIGNIFICANT CHANGE UP
ANION GAP SERPL CALC-SCNC: 9 MMOL/L — SIGNIFICANT CHANGE UP (ref 5–17)
BUN SERPL-MCNC: 9 MG/DL — SIGNIFICANT CHANGE UP (ref 7–23)
CALCIUM SERPL-MCNC: 8.3 MG/DL — LOW (ref 8.4–10.5)
CHLORIDE SERPL-SCNC: 104 MMOL/L — SIGNIFICANT CHANGE UP (ref 96–108)
CO2 SERPL-SCNC: 26 MMOL/L — SIGNIFICANT CHANGE UP (ref 22–31)
CREAT SERPL-MCNC: 0.9 MG/DL — SIGNIFICANT CHANGE UP (ref 0.5–1.3)
CULTURE RESULTS: SIGNIFICANT CHANGE UP
GLUCOSE SERPL-MCNC: 109 MG/DL — HIGH (ref 70–99)
HCT VFR BLD CALC: 42.1 % — SIGNIFICANT CHANGE UP (ref 39–50)
HGB BLD-MCNC: 13.6 G/DL — SIGNIFICANT CHANGE UP (ref 13–17)
MAGNESIUM SERPL-MCNC: 1.8 MG/DL — SIGNIFICANT CHANGE UP (ref 1.6–2.6)
MCHC RBC-ENTMCNC: 29.4 PG — SIGNIFICANT CHANGE UP (ref 27–34)
MCHC RBC-ENTMCNC: 32.3 GM/DL — SIGNIFICANT CHANGE UP (ref 32–36)
MCV RBC AUTO: 90.9 FL — SIGNIFICANT CHANGE UP (ref 80–100)
METHOD TYPE: SIGNIFICANT CHANGE UP
NRBC # BLD: 0 /100 WBCS — SIGNIFICANT CHANGE UP (ref 0–0)
ORGANISM # SPEC MICROSCOPIC CNT: SIGNIFICANT CHANGE UP
PHOSPHATE SERPL-MCNC: 2.9 MG/DL — SIGNIFICANT CHANGE UP (ref 2.5–4.5)
PLATELET # BLD AUTO: 206 K/UL — SIGNIFICANT CHANGE UP (ref 150–400)
POTASSIUM SERPL-MCNC: 4.1 MMOL/L — SIGNIFICANT CHANGE UP (ref 3.5–5.3)
POTASSIUM SERPL-SCNC: 4.1 MMOL/L — SIGNIFICANT CHANGE UP (ref 3.5–5.3)
RBC # BLD: 4.63 M/UL — SIGNIFICANT CHANGE UP (ref 4.2–5.8)
RBC # FLD: 13.1 % — SIGNIFICANT CHANGE UP (ref 10.3–14.5)
SODIUM SERPL-SCNC: 139 MMOL/L — SIGNIFICANT CHANGE UP (ref 135–145)
SPECIMEN SOURCE: SIGNIFICANT CHANGE UP
WBC # BLD: 9.14 K/UL — SIGNIFICANT CHANGE UP (ref 3.8–10.5)
WBC # FLD AUTO: 9.14 K/UL — SIGNIFICANT CHANGE UP (ref 3.8–10.5)

## 2019-12-17 RX ORDER — CEFPODOXIME PROXETIL 100 MG
1 TABLET ORAL
Qty: 6 | Refills: 0
Start: 2019-12-17 | End: 2019-12-19

## 2019-12-17 RX ORDER — METRONIDAZOLE 500 MG
1 TABLET ORAL
Qty: 9 | Refills: 0
Start: 2019-12-17 | End: 2019-12-19

## 2019-12-17 RX ADMIN — Medication 100 MILLIGRAM(S): at 14:36

## 2019-12-17 RX ADMIN — Medication 975 MILLIGRAM(S): at 12:04

## 2019-12-17 RX ADMIN — Medication 15 MILLIGRAM(S): at 00:30

## 2019-12-17 RX ADMIN — Medication 975 MILLIGRAM(S): at 00:09

## 2019-12-17 RX ADMIN — Medication 15 MILLIGRAM(S): at 12:04

## 2019-12-17 RX ADMIN — CEFTRIAXONE 100 MILLIGRAM(S): 500 INJECTION, POWDER, FOR SOLUTION INTRAMUSCULAR; INTRAVENOUS at 13:31

## 2019-12-17 RX ADMIN — Medication 975 MILLIGRAM(S): at 00:30

## 2019-12-17 RX ADMIN — Medication 975 MILLIGRAM(S): at 05:36

## 2019-12-17 RX ADMIN — Medication 975 MILLIGRAM(S): at 05:04

## 2019-12-17 RX ADMIN — Medication 15 MILLIGRAM(S): at 00:09

## 2019-12-17 RX ADMIN — Medication 15 MILLIGRAM(S): at 05:36

## 2019-12-17 RX ADMIN — Medication 100 MILLIGRAM(S): at 05:04

## 2019-12-17 RX ADMIN — Medication 15 MILLIGRAM(S): at 05:04

## 2019-12-17 NOTE — DISCHARGE NOTE PROVIDER - NSDCCPCAREPLAN_GEN_ALL_CORE_FT
PRINCIPAL DISCHARGE DIAGNOSIS  Diagnosis: Appendicitis with abscess  Assessment and Plan of Treatment: Please resume all regular home medications unless specifically advised not to take a particular medication.   Please take pain medication as prescribed for severe pain as needed.  Please take cefpodoxime and flagyl both oral antibiotics as prescribed for 3 more days  Please get plenty of rest, continue to ambulate several times per day, and drink adequate amounts of fluids.   Avoid lifting weights greater than 5-10 lbs until you follow-up with your surgeon, who will instruct you further regarding activity restrictions.  Avoid driving or operating heavy machinery while taking pain medications.  You may shower, no swimming or baths until your follow up appointment and you are cleared by your surgeon.   Please follow-up with your surgeon Dr. Jj in 1-2 weeks. Please call his offce at [phone] to schedule an appointment.

## 2019-12-17 NOTE — DISCHARGE NOTE PROVIDER - NSDCMRMEDTOKEN_GEN_ALL_CORE_FT
cefpodoxime 200 mg oral tablet: 1 tab(s) orally 2 times a day   Flagyl 500 mg oral tablet: 1 tab(s) orally every 8 hours   oxycodone-acetaminophen 5 mg-325 mg oral tablet: 1 tab(s) orally every 6 hours, As Needed for severe pain MDD:4

## 2019-12-17 NOTE — PROGRESS NOTE ADULT - SUBJECTIVE AND OBJECTIVE BOX
STATUS POST:  Laparoscopic appendectomy    Patient seen and examined at bedside. In no acute distress. Denies N/V. Is tolerating diet, having BM and ambulating.     OBJECTIVE:    Vital Signs Last 24 Hrs  T(C): 36.9 (17 Dec 2019 05:25), Max: 37.2 (16 Dec 2019 17:04)  T(F): 98.4 (17 Dec 2019 05:25), Max: 99 (16 Dec 2019 17:04)  HR: 62 (17 Dec 2019 05:25) (62 - 78)  BP: 111/70 (17 Dec 2019 05:25) (111/70 - 129/69)  BP(mean): --  RR: 17 (17 Dec 2019 05:25) (15 - 18)  SpO2: 95% (17 Dec 2019 05:25) (95% - 98%)    I&O's Summary    16 Dec 2019 07:01  -  17 Dec 2019 07:00  --------------------------------------------------------  IN: 1290 mL / OUT: 2025 mL / NET: -735 mL        Physical Exam:  General Appearance: Appears well, NAD  HEENT: Grossly symmetric  Chest: Non labored breathing  CV: Pulse regular presently  Abdomen: Soft, nontender, nondistended, dressings clean and dry and intact  Extremities: Grossly symmetric, no swelling, warm.     LABS:                        12.9   12.19 )-----------( 195      ( 16 Dec 2019 07:47 )             41.5     12-16    142  |  108  |  11  ----------------------------<  102<H>  4.0   |  24  |  0.93    Ca    8.0<L>      16 Dec 2019 07:47  Phos  1.3     12-16  Mg     2.0     12-16            RADIOLOGY & ADDITIONAL STUDIES:

## 2019-12-17 NOTE — PROGRESS NOTE ADULT - ASSESSMENT
39M PMH unknown heart valve problem, presets with perforated appendicitis now s/p lap appy 12/14    Patient is recovering well. Will consider Dispo pending this mornings labs.     - reg diet   - pain/nausea control  - Ceftriaxone/Flagyl for perforated appendicitis  - Inderjit drain to bulb suction   - Lovenox, OOB/A, SCDs, IS  - AM labs

## 2019-12-17 NOTE — DISCHARGE NOTE PROVIDER - HOSPITAL COURSE
39M PMH unknown heart valve problem, presets with 12 hours of diffuse abdominal pain. The pain is described as sharp and can't be characterized into a quadrant. The patient last ate at 1AM, and has not had any nausea/vomiting. He has passed flatus and a bowel movement this morning. He has had chills, but no subjective fevers, sick contacts, or recent travel. He denies any history of colonoscopy, EGD, or IBD. His heart valve problem is followed once a year with an echo by a cardiologist, but has never required intervention or medication. The patient had a CT that showed acute appendicitis. The patient went to the OR for laparoscopic appendectomy on 12/14. The patient tolerated the procedure well and there were no complications. On post op day 1 the patient was advanced to regular diet which was tolerated. On post op day 2 the patients white count improved and the patient was passing flatus and having regular bowel movements. On post op day 3 the patients drain was removed. The patient is being discharged to home with 3 more days of antibiotics.

## 2019-12-17 NOTE — DISCHARGE NOTE NURSING/CASE MANAGEMENT/SOCIAL WORK - PATIENT PORTAL LINK FT
You can access the FollowMyHealth Patient Portal offered by [facility] by registering at the following website: [email or web address]. By joining [employer]’s FollowMyHealth portal, you will also be able to view your health information using other applications (apps) compatible with our system.

## 2019-12-17 NOTE — PROGRESS NOTE ADULT - ATTENDING COMMENTS
Doing well.  Incisions clean  AFVSS  Drain removed.    d/c home  f/u 2-3 weeks.  Postop care reviewed.

## 2019-12-17 NOTE — DISCHARGE NOTE PROVIDER - CARE PROVIDER_API CALL
Esperanza Jj)  ColonRectal Surgery; Surgery  [address] [place] [address], [address]  [place] [address]  Phone: [phone]  Fax: [phone]  Follow Up Time:

## 2019-12-22 DIAGNOSIS — K35.80 UNSPECIFIED ACUTE APPENDICITIS: ICD-10-CM

## 2019-12-22 DIAGNOSIS — K35.33 ACUTE APPENDICITIS WITH PERFORATION, LOCALIZED PERITONITIS, AND GANGRENE, WITH ABSCESS: ICD-10-CM

## 2019-12-23 LAB — SURGICAL PATHOLOGY STUDY: SIGNIFICANT CHANGE UP

## 2019-12-24 PROCEDURE — 87075 CULTR BACTERIA EXCEPT BLOOD: CPT

## 2019-12-24 PROCEDURE — 83735 ASSAY OF MAGNESIUM: CPT

## 2019-12-24 PROCEDURE — 96374 THER/PROPH/DIAG INJ IV PUSH: CPT | Mod: XU

## 2019-12-24 PROCEDURE — C1889: CPT

## 2019-12-24 PROCEDURE — 82962 GLUCOSE BLOOD TEST: CPT

## 2019-12-24 PROCEDURE — 88304 TISSUE EXAM BY PATHOLOGIST: CPT

## 2019-12-24 PROCEDURE — 96375 TX/PRO/DX INJ NEW DRUG ADDON: CPT

## 2019-12-24 PROCEDURE — 81003 URINALYSIS AUTO W/O SCOPE: CPT

## 2019-12-24 PROCEDURE — 83690 ASSAY OF LIPASE: CPT

## 2019-12-24 PROCEDURE — 99285 EMERGENCY DEPT VISIT HI MDM: CPT | Mod: 25

## 2019-12-24 PROCEDURE — 84100 ASSAY OF PHOSPHORUS: CPT

## 2019-12-24 PROCEDURE — 36415 COLL VENOUS BLD VENIPUNCTURE: CPT

## 2019-12-24 PROCEDURE — 85025 COMPLETE CBC W/AUTO DIFF WBC: CPT

## 2019-12-24 PROCEDURE — 85027 COMPLETE CBC AUTOMATED: CPT

## 2019-12-24 PROCEDURE — 83605 ASSAY OF LACTIC ACID: CPT

## 2019-12-24 PROCEDURE — 93005 ELECTROCARDIOGRAM TRACING: CPT

## 2019-12-24 PROCEDURE — 80048 BASIC METABOLIC PNL TOTAL CA: CPT

## 2019-12-24 PROCEDURE — 87186 SC STD MICRODIL/AGAR DIL: CPT

## 2019-12-24 PROCEDURE — 80053 COMPREHEN METABOLIC PANEL: CPT

## 2019-12-24 PROCEDURE — 74177 CT ABD & PELVIS W/CONTRAST: CPT

## 2019-12-24 PROCEDURE — 87070 CULTURE OTHR SPECIMN AEROBIC: CPT

## 2022-09-21 NOTE — ED PROVIDER NOTE - NS_EDPROVIDERDISPOUSERTYPE_ED_A_ED
Attending Attestation (For Attendings USE Only)... Doxepin Counseling:  Patient advised that the medication is sedating and not to drive a car after taking this medication. Patient informed of potential adverse effects including but not limited to dry mouth, urinary retention, and blurry vision.  The patient verbalized understanding of the proper use and possible adverse effects of doxepin.  All of the patient's questions and concerns were addressed.
